# Patient Record
Sex: MALE | URBAN - NONMETROPOLITAN AREA
[De-identification: names, ages, dates, MRNs, and addresses within clinical notes are randomized per-mention and may not be internally consistent; named-entity substitution may affect disease eponyms.]

---

## 2021-04-09 ENCOUNTER — OFFICE VISIT (OUTPATIENT)
Dept: URBAN - NONMETROPOLITAN AREA CLINIC 10 | Facility: CLINIC | Age: 71
End: 2021-04-09
Payer: MEDICARE

## 2021-04-09 PROCEDURE — 99203 OFFICE O/P NEW LOW 30 MIN: CPT | Performed by: OPHTHALMOLOGY

## 2021-04-09 RX ORDER — PREDNISOLONE ACETATE 10 MG/ML
1 % SUSPENSION/ DROPS OPHTHALMIC
Qty: 5 | Refills: 0 | Status: INACTIVE
Start: 2021-04-09 | End: 2021-04-30

## 2021-04-09 RX ORDER — FAMCICLOVIR 500 MG/1
500 MG TABLET, FILM COATED ORAL
Qty: 21 | Refills: 0 | Status: ACTIVE
Start: 2021-04-09

## 2021-04-09 ASSESSMENT — VISUAL ACUITY: OD: 20/50

## 2021-04-09 ASSESSMENT — INTRAOCULAR PRESSURE
OD: 15
OS: 15

## 2021-04-16 ENCOUNTER — OFFICE VISIT (OUTPATIENT)
Dept: URBAN - NONMETROPOLITAN AREA CLINIC 10 | Facility: CLINIC | Age: 71
End: 2021-04-16
Payer: MEDICARE

## 2021-04-16 PROCEDURE — 92002 INTRM OPH EXAM NEW PATIENT: CPT | Performed by: OPTOMETRIST

## 2021-04-16 RX ORDER — GANCICLOVIR 1.5 MG/G
0.15 % GEL OPHTHALMIC
Qty: 5 | Refills: 2 | Status: INACTIVE
Start: 2021-04-16 | End: 2021-04-30

## 2021-04-16 NOTE — IMPRESSION/PLAN
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 Plan: Not improved. Continue Prednisolone QID OD with oral antiviral coverage of Famvir 500mg TID p.o. Start Ganciclovir 5 x day Od. Patient will likely need gradual taper of topical steroid with closing monitoring. Call if any worsening. Follow-up next week.

## 2021-04-30 ENCOUNTER — OFFICE VISIT (OUTPATIENT)
Dept: URBAN - NONMETROPOLITAN AREA CLINIC 10 | Facility: CLINIC | Age: 71
End: 2021-04-30
Payer: MEDICARE

## 2021-04-30 DIAGNOSIS — B00.59 OTHER HERPESVIRAL DISEASE OF EYE: Primary | ICD-10-CM

## 2021-04-30 PROCEDURE — 99213 OFFICE O/P EST LOW 20 MIN: CPT | Performed by: OPTOMETRIST

## 2021-04-30 RX ORDER — TRIFLURIDINE 1 G/100ML
1 % SOLUTION OPHTHALMIC
Qty: 5 | Refills: 2 | Status: ACTIVE
Start: 2021-04-30

## 2021-04-30 RX ORDER — GANCICLOVIR 1.5 MG/G
0.15 % GEL OPHTHALMIC
Qty: 5 | Refills: 2 | Status: ACTIVE
Start: 2021-04-30

## 2021-04-30 RX ORDER — PREDNISOLONE ACETATE 10 MG/ML
1 % SUSPENSION/ DROPS OPHTHALMIC
Qty: 5 | Refills: 0 | Status: ACTIVE
Start: 2021-04-30

## 2021-04-30 NOTE — IMPRESSION/PLAN
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 Plan: slight improvement. Patient did not use gtts from last visit resent gtts to pharmacy. Start Prednisolone QID OD with oral antiviral coverage of Famvir 500mg TID p.o. Start Ganciclovir 5 x day Od. Patient will likely need gradual taper of topical steroid with closing monitoring. Call if any worsening. Follow-up next week.

## 2021-05-14 ENCOUNTER — OFFICE VISIT (OUTPATIENT)
Dept: URBAN - NONMETROPOLITAN AREA CLINIC 10 | Facility: CLINIC | Age: 71
End: 2021-05-14
Payer: MEDICARE

## 2021-05-14 PROCEDURE — 99212 OFFICE O/P EST SF 10 MIN: CPT | Performed by: OPTOMETRIST

## 2021-05-14 NOTE — IMPRESSION/PLAN
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 Plan: Improving. Decrease trifluridine gtts to BID x 1 week. D/C Prednisolone.

## 2022-06-27 ENCOUNTER — OFFICE VISIT (OUTPATIENT)
Dept: URBAN - NONMETROPOLITAN AREA CLINIC 10 | Facility: CLINIC | Age: 72
End: 2022-06-27
Payer: MEDICARE

## 2022-06-27 DIAGNOSIS — H25.13 AGE-RELATED NUCLEAR CATARACT, BILATERAL: ICD-10-CM

## 2022-06-27 DIAGNOSIS — B00.59 OTHER HERPESVIRAL DISEASE OF EYE: Primary | ICD-10-CM

## 2022-06-27 PROCEDURE — 92014 COMPRE OPH EXAM EST PT 1/>: CPT | Performed by: OPTOMETRIST

## 2022-06-27 ASSESSMENT — INTRAOCULAR PRESSURE
OS: 16
OD: 15

## 2022-06-27 ASSESSMENT — KERATOMETRY
OD: 42.88
OS: 43.13

## 2022-06-27 NOTE — IMPRESSION/PLAN
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 Plan: Discussed diagnosis in detail with patient. Monitor.  Continue care with MINIMALLY INVASIVE SURGERY [facility].